# Patient Record
Sex: MALE | Race: WHITE | NOT HISPANIC OR LATINO | Employment: OTHER | URBAN - METROPOLITAN AREA
[De-identification: names, ages, dates, MRNs, and addresses within clinical notes are randomized per-mention and may not be internally consistent; named-entity substitution may affect disease eponyms.]

---

## 2018-02-05 ENCOUNTER — ESTABLISHED COMPREHENSIVE EXAM (OUTPATIENT)
Dept: URBAN - METROPOLITAN AREA CLINIC 43 | Facility: CLINIC | Age: 78
End: 2018-02-05

## 2018-02-05 DIAGNOSIS — H43.813: ICD-10-CM

## 2018-02-05 PROCEDURE — G8427 DOCREV CUR MEDS BY ELIG CLIN: HCPCS

## 2018-02-05 PROCEDURE — 92014 COMPRE OPH EXAM EST PT 1/>: CPT

## 2018-02-05 PROCEDURE — 1036F TOBACCO NON-USER: CPT

## 2018-02-05 PROCEDURE — 92015 DETERMINE REFRACTIVE STATE: CPT

## 2018-02-05 PROCEDURE — G8785 BP SCRN NO PERF AT INTERVAL: HCPCS

## 2018-02-05 ASSESSMENT — TONOMETRY
OD_IOP_MMHG: 17
OS_IOP_MMHG: 18

## 2018-02-05 ASSESSMENT — VISUAL ACUITY
OS_CC: 20/30-1
OS_CC: J1
OS_SC: 20/60-1
OD_SC: J12
OD_CC: 20/25-1
OD_SC: 20/40-2
OD_CC: J1
OS_SC: J6

## 2018-08-16 NOTE — PATIENT DISCUSSION
Pt denies any h/o iritis, and reports no symptoms, (no pain, photophobia) denies any underlying medical issues related to iritis. Begin PF q2H x 48 hrs, then taper to QID x 5 days, TID x 3 days, BID x 3 days, QD x 3 days, then stop. F/U 2 weeks.

## 2018-08-30 NOTE — PATIENT DISCUSSION
Cont taper PF as directed. AC cleared up. Edu if any episodes of redness, photophobia, pain or blurred vision, RTC. Pt denies any h/o iritis, and reports no symptoms, (no pain, photophobia) denies any underlying medical issues related to iritis.

## 2018-09-20 NOTE — PATIENT DISCUSSION
Adjusted Rx closer to hab Rx. Discussed that it is normal to have to turn head in the direction you want to look to see clearly as a PAL has a corridor of Rx down the center.

## 2021-03-02 ENCOUNTER — ESTABLISHED COMPREHENSIVE EXAM (OUTPATIENT)
Dept: URBAN - METROPOLITAN AREA CLINIC 43 | Facility: CLINIC | Age: 81
End: 2021-03-02

## 2021-03-02 DIAGNOSIS — H25.9: ICD-10-CM

## 2021-03-02 DIAGNOSIS — H35.3131: ICD-10-CM

## 2021-03-02 DIAGNOSIS — H43.813: ICD-10-CM

## 2021-03-02 PROCEDURE — 92015 DETERMINE REFRACTIVE STATE: CPT

## 2021-03-02 PROCEDURE — 92014 COMPRE OPH EXAM EST PT 1/>: CPT

## 2021-03-02 ASSESSMENT — VISUAL ACUITY
OS_BAT: 20/200+1
OD_CC: J1
OS_SC: 20/60-2
OS_CC: 20/40+2
OD_CC: 20/30+2
OD_BAT: 20/80-2
OD_SC: 20/40-2
OD_SC: J12
OS_SC: J12
OS_CC: J1

## 2021-03-02 ASSESSMENT — TONOMETRY
OS_IOP_MMHG: 16
OD_IOP_MMHG: 14

## 2021-04-06 ENCOUNTER — CATARACT CONSULT (OUTPATIENT)
Dept: URBAN - METROPOLITAN AREA CLINIC 43 | Facility: CLINIC | Age: 81
End: 2021-04-06

## 2021-04-06 DIAGNOSIS — H43.813: ICD-10-CM

## 2021-04-06 DIAGNOSIS — H04.123: ICD-10-CM

## 2021-04-06 DIAGNOSIS — H35.3131: ICD-10-CM

## 2021-04-06 DIAGNOSIS — H25.811: ICD-10-CM

## 2021-04-06 DIAGNOSIS — H25.812: ICD-10-CM

## 2021-04-06 DIAGNOSIS — H18.513: ICD-10-CM

## 2021-04-06 PROCEDURE — 92014 COMPRE OPH EXAM EST PT 1/>: CPT

## 2021-04-06 PROCEDURE — 92286 ANT SGM IMG I&R SPECLR MIC: CPT

## 2021-04-06 PROCEDURE — V2799PMN IMPRIMIS PRED-MOXI-NEPAF 5ML

## 2021-04-06 PROCEDURE — 92136TC INTERFEROMETRY - TECHNICAL COMPONENT

## 2021-04-06 PROCEDURE — 92025-3 CORNEAL TOPO, REFUSED

## 2021-04-06 PROCEDURE — 92134 CPTRZ OPH DX IMG PST SGM RTA: CPT

## 2021-04-06 ASSESSMENT — VISUAL ACUITY
OS_CC: 20/40
OS_SC: J12
OD_CC: 20/30
OS_CC: J2
OS_AM: 20/20
OD_CC: J2
OS_BAT: 20/200
OD_BAT: 20/100
OS_SC: 20/70
OD_SC: 20/50
OD_SC: J12
OD_RAM: 20/20

## 2021-04-06 ASSESSMENT — TONOMETRY
OD_IOP_MMHG: 15
OS_IOP_MMHG: 16

## 2021-04-28 ENCOUNTER — PRE-OP/H&P (OUTPATIENT)
Dept: URBAN - METROPOLITAN AREA CLINIC 39 | Facility: CLINIC | Age: 81
End: 2021-04-28

## 2021-04-28 ENCOUNTER — SURGERY/PROCEDURE (OUTPATIENT)
Dept: URBAN - METROPOLITAN AREA CLINIC 43 | Facility: CLINIC | Age: 81
End: 2021-04-28

## 2021-04-28 DIAGNOSIS — H25.812: ICD-10-CM

## 2021-04-28 PROCEDURE — 66984 XCAPSL CTRC RMVL W/O ECP: CPT

## 2021-04-28 PROCEDURE — 99211T TECH SERVICE

## 2021-04-29 ENCOUNTER — POST OP/EVAL OF SECOND EYE (OUTPATIENT)
Dept: URBAN - METROPOLITAN AREA CLINIC 43 | Facility: CLINIC | Age: 81
End: 2021-04-29

## 2021-04-29 DIAGNOSIS — H43.813: ICD-10-CM

## 2021-04-29 DIAGNOSIS — H35.3131: ICD-10-CM

## 2021-04-29 DIAGNOSIS — H04.123: ICD-10-CM

## 2021-04-29 DIAGNOSIS — Z96.1: ICD-10-CM

## 2021-04-29 DIAGNOSIS — H25.811: ICD-10-CM

## 2021-04-29 PROCEDURE — 99213 OFFICE O/P EST LOW 20 MIN: CPT

## 2021-04-29 PROCEDURE — 99024 POSTOP FOLLOW-UP VISIT: CPT

## 2021-04-29 ASSESSMENT — VISUAL ACUITY
OD_SC: 20/50-1
OS_SC: 20/30-1
OD_BAT: 20/100
OD_SC: J10
OS_SC: J8-

## 2021-04-29 ASSESSMENT — TONOMETRY
OD_IOP_MMHG: 17
OS_IOP_MMHG: 17

## 2021-05-05 ENCOUNTER — SURGERY/PROCEDURE (OUTPATIENT)
Dept: URBAN - METROPOLITAN AREA CLINIC 43 | Facility: CLINIC | Age: 81
End: 2021-05-05

## 2021-05-05 ENCOUNTER — PRE-OP/H&P (OUTPATIENT)
Dept: URBAN - METROPOLITAN AREA CLINIC 39 | Facility: CLINIC | Age: 81
End: 2021-05-05

## 2021-05-05 DIAGNOSIS — H25.811: ICD-10-CM

## 2021-05-05 DIAGNOSIS — Z96.1: ICD-10-CM

## 2021-05-05 PROCEDURE — 66984 XCAPSL CTRC RMVL W/O ECP: CPT

## 2021-05-05 PROCEDURE — 99211T TECH SERVICE

## 2021-05-05 ASSESSMENT — VISUAL ACUITY
OS_SC: 20/25-2
OD_SC: 20/50
OD_SC: J12
OS_SC: J6

## 2021-05-05 ASSESSMENT — TONOMETRY
OS_IOP_MMHG: 18
OD_IOP_MMHG: 14

## 2021-05-06 ENCOUNTER — CATARACT POST-OP 1-DAY (OUTPATIENT)
Dept: URBAN - METROPOLITAN AREA CLINIC 43 | Facility: CLINIC | Age: 81
End: 2021-05-06

## 2021-05-06 DIAGNOSIS — Z96.1: ICD-10-CM

## 2021-05-06 PROCEDURE — 99024 POSTOP FOLLOW-UP VISIT: CPT

## 2021-05-06 ASSESSMENT — VISUAL ACUITY
OD_SC: 20/25
OS_SC: 20/25

## 2021-05-06 ASSESSMENT — TONOMETRY
OS_IOP_MMHG: 14
OD_IOP_MMHG: 14

## 2021-05-20 ENCOUNTER — POST-OP CATARACT (OUTPATIENT)
Dept: URBAN - METROPOLITAN AREA CLINIC 43 | Facility: CLINIC | Age: 81
End: 2021-05-20

## 2021-05-20 DIAGNOSIS — Z96.1: ICD-10-CM

## 2021-05-20 PROCEDURE — 99024 POSTOP FOLLOW-UP VISIT: CPT

## 2021-05-20 ASSESSMENT — TONOMETRY
OD_IOP_MMHG: 16
OS_IOP_MMHG: 18

## 2021-05-20 ASSESSMENT — VISUAL ACUITY
OD_SC: 20/40-1
OS_SC: 20/30
OU_SC: 20/30+2
OS_SC: J6
OD_SC: J8

## 2021-11-15 ENCOUNTER — ESTABLISHED COMPREHENSIVE EXAM (OUTPATIENT)
Dept: URBAN - METROPOLITAN AREA CLINIC 43 | Facility: CLINIC | Age: 81
End: 2021-11-15

## 2021-11-15 DIAGNOSIS — Z96.1: ICD-10-CM

## 2021-11-15 DIAGNOSIS — H43.813: ICD-10-CM

## 2021-11-15 DIAGNOSIS — H04.123: ICD-10-CM

## 2021-11-15 DIAGNOSIS — H35.3131: ICD-10-CM

## 2021-11-15 DIAGNOSIS — H26.493: ICD-10-CM

## 2021-11-15 PROCEDURE — 92014 COMPRE OPH EXAM EST PT 1/>: CPT

## 2021-11-15 PROCEDURE — 92015 DETERMINE REFRACTIVE STATE: CPT

## 2021-11-15 ASSESSMENT — TONOMETRY
OD_IOP_MMHG: 14
OS_IOP_MMHG: 14

## 2021-11-15 ASSESSMENT — VISUAL ACUITY
OS_SC: J6
OD_CC: J1
OS_CC: 20/70
OD_CC: 20/30
OS_SC: 20/30-1
OD_SC: 20/40-2
OD_SC: J10
OS_CC: J1

## 2023-01-24 ENCOUNTER — COMPREHENSIVE EXAM (OUTPATIENT)
Dept: URBAN - METROPOLITAN AREA CLINIC 43 | Facility: CLINIC | Age: 83
End: 2023-01-24

## 2023-01-24 DIAGNOSIS — E11.9: ICD-10-CM

## 2023-01-24 DIAGNOSIS — H35.3131: ICD-10-CM

## 2023-01-24 DIAGNOSIS — H04.123: ICD-10-CM

## 2023-01-24 DIAGNOSIS — H26.493: ICD-10-CM

## 2023-01-24 DIAGNOSIS — H43.813: ICD-10-CM

## 2023-01-24 DIAGNOSIS — Z96.1: ICD-10-CM

## 2023-01-24 PROCEDURE — 92014 COMPRE OPH EXAM EST PT 1/>: CPT

## 2023-01-24 PROCEDURE — 92015 DETERMINE REFRACTIVE STATE: CPT

## 2023-01-24 ASSESSMENT — VISUAL ACUITY
OS_SC: J6-
OD_SC: 20/25-2
OS_CC: J1
OD_CC: J2
OS_SC: 20/20-2
OD_SC: J10-

## 2023-01-24 ASSESSMENT — TONOMETRY
OD_IOP_MMHG: 17
OS_IOP_MMHG: 19

## 2024-01-24 ENCOUNTER — COMPREHENSIVE EXAM (OUTPATIENT)
Dept: URBAN - METROPOLITAN AREA CLINIC 43 | Facility: CLINIC | Age: 84
End: 2024-01-24

## 2024-01-24 DIAGNOSIS — H04.123: ICD-10-CM

## 2024-01-24 DIAGNOSIS — H43.813: ICD-10-CM

## 2024-01-24 DIAGNOSIS — E11.9: ICD-10-CM

## 2024-01-24 DIAGNOSIS — Z96.1: ICD-10-CM

## 2024-01-24 DIAGNOSIS — H35.3131: ICD-10-CM

## 2024-01-24 DIAGNOSIS — H26.493: ICD-10-CM

## 2024-01-24 PROCEDURE — 92015 DETERMINE REFRACTIVE STATE: CPT

## 2024-01-24 PROCEDURE — 92014 COMPRE OPH EXAM EST PT 1/>: CPT

## 2024-01-24 ASSESSMENT — VISUAL ACUITY
OS_CC: J2
OD_SC: 20/25-1
OS_SC: J6-
OS_SC: 20/25+2
OD_SC: J10-
OU_CC: J1
OD_CC: J1

## 2024-01-24 ASSESSMENT — TONOMETRY
OD_IOP_MMHG: 17
OS_IOP_MMHG: 14

## 2025-02-05 ENCOUNTER — PREPPED CHART (OUTPATIENT)
Age: 85
End: 2025-02-05

## 2025-08-29 ENCOUNTER — COMPREHENSIVE EXAM (OUTPATIENT)
Age: 85
End: 2025-08-29

## 2025-08-29 DIAGNOSIS — H35.3131: ICD-10-CM

## 2025-08-29 DIAGNOSIS — H04.123: ICD-10-CM

## 2025-08-29 DIAGNOSIS — H43.811: ICD-10-CM

## 2025-08-29 DIAGNOSIS — H43.813: ICD-10-CM

## 2025-08-29 DIAGNOSIS — H26.493: ICD-10-CM

## 2025-08-29 DIAGNOSIS — E11.9: ICD-10-CM

## 2025-08-29 DIAGNOSIS — Z96.1: ICD-10-CM

## 2025-08-29 PROCEDURE — 92250 FUNDUS PHOTOGRAPHY W/I&R: CPT

## 2025-08-29 PROCEDURE — 92014 COMPRE OPH EXAM EST PT 1/>: CPT

## 2025-08-29 PROCEDURE — 92015 DETERMINE REFRACTIVE STATE: CPT
